# Patient Record
Sex: FEMALE | Race: BLACK OR AFRICAN AMERICAN
[De-identification: names, ages, dates, MRNs, and addresses within clinical notes are randomized per-mention and may not be internally consistent; named-entity substitution may affect disease eponyms.]

---

## 2018-02-24 ENCOUNTER — HOSPITAL ENCOUNTER (EMERGENCY)
Dept: HOSPITAL 54 - ER | Age: 43
Discharge: HOME | End: 2018-02-24
Payer: SELF-PAY

## 2018-02-24 VITALS — SYSTOLIC BLOOD PRESSURE: 121 MMHG | DIASTOLIC BLOOD PRESSURE: 84 MMHG

## 2018-02-24 VITALS — WEIGHT: 189 LBS | BODY MASS INDEX: 30.37 KG/M2 | HEIGHT: 66 IN

## 2018-02-24 DIAGNOSIS — Z88.1: ICD-10-CM
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
## 2018-02-24 DIAGNOSIS — M54.5: ICD-10-CM

## 2018-02-24 DIAGNOSIS — R51: Primary | ICD-10-CM

## 2018-02-24 DIAGNOSIS — Z88.5: ICD-10-CM
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
## 2018-02-24 DIAGNOSIS — Z88.6: ICD-10-CM
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
## 2018-02-24 DIAGNOSIS — Z98.890: ICD-10-CM
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
## 2018-02-24 PROCEDURE — A4606 OXYGEN PROBE USED W OXIMETER: HCPCS

## 2018-02-24 PROCEDURE — Z7610: HCPCS

## 2018-02-24 PROCEDURE — 84703 CHORIONIC GONADOTROPIN ASSAY: CPT

## 2018-02-24 PROCEDURE — 72100 X-RAY EXAM L-S SPINE 2/3 VWS: CPT

## 2018-02-24 PROCEDURE — 99285 EMERGENCY DEPT VISIT HI MDM: CPT

## 2018-02-24 PROCEDURE — 96372 THER/PROPH/DIAG INJ SC/IM: CPT
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
